# Patient Record
Sex: MALE | Race: BLACK OR AFRICAN AMERICAN | NOT HISPANIC OR LATINO | Employment: OTHER | URBAN - METROPOLITAN AREA
[De-identification: names, ages, dates, MRNs, and addresses within clinical notes are randomized per-mention and may not be internally consistent; named-entity substitution may affect disease eponyms.]

---

## 2024-06-13 ENCOUNTER — HOSPITAL ENCOUNTER (EMERGENCY)
Facility: HOSPITAL | Age: 79
Discharge: HOME | End: 2024-06-13
Attending: INTERNAL MEDICINE
Payer: OTHER GOVERNMENT

## 2024-06-13 VITALS
OXYGEN SATURATION: 96 % | RESPIRATION RATE: 20 BRPM | WEIGHT: 150 LBS | HEIGHT: 68 IN | BODY MASS INDEX: 22.73 KG/M2 | HEART RATE: 84 BPM | SYSTOLIC BLOOD PRESSURE: 113 MMHG | DIASTOLIC BLOOD PRESSURE: 75 MMHG | TEMPERATURE: 98 F

## 2024-06-13 DIAGNOSIS — J90 PLEURAL EFFUSION: ICD-10-CM

## 2024-06-13 DIAGNOSIS — R06.02 SOB (SHORTNESS OF BREATH): ICD-10-CM

## 2024-06-13 DIAGNOSIS — I50.9 ACUTE ON CHRONIC CONGESTIVE HEART FAILURE, UNSPECIFIED HEART FAILURE TYPE: ICD-10-CM

## 2024-06-13 DIAGNOSIS — I50.20 HFREF (HEART FAILURE WITH REDUCED EJECTION FRACTION): Primary | ICD-10-CM

## 2024-06-13 DIAGNOSIS — R79.89 ELEVATED TROPONIN: ICD-10-CM

## 2024-06-13 DIAGNOSIS — R06.00 DYSPNEA: ICD-10-CM

## 2024-06-13 PROBLEM — F01.50 VASCULAR DEMENTIA, UNSPECIFIED SEVERITY, WITHOUT BEHAVIORAL DISTURBANCE, PSYCHOTIC DISTURBANCE, MOOD DISTURBANCE, AND ANXIETY: Status: ACTIVE | Noted: 2024-06-13

## 2024-06-13 LAB
ALBUMIN SERPL-MCNC: 3.8 G/DL (ref 3.3–5.5)
ALP SERPL-CCNC: 93 U/L (ref 42–141)
BILIRUB SERPL-MCNC: 1.5 MG/DL (ref 0.2–1.6)
BUN SERPL-MCNC: 25 MG/DL (ref 7–22)
CALCIUM SERPL-MCNC: 9.8 MG/DL (ref 8–10.3)
CHLORIDE SERPL-SCNC: 106 MMOL/L (ref 98–108)
CREAT SERPL-MCNC: 1.6 MG/DL (ref 0.6–1.2)
GLUCOSE SERPL-MCNC: 115 MG/DL (ref 73–118)
HCT, POC: NORMAL
HGB, POC: NORMAL (ref 14–18)
MCH, POC: NORMAL
MCHC, POC: NORMAL
MCV, POC: NORMAL
MPV, POC: NORMAL
POC ALT (SGPT): 19 U/L (ref 10–47)
POC AST (SGOT): 31 U/L (ref 11–38)
POC B-TYPE NATRIURETIC PEPTIDE: 3290 PG/ML (ref 0–100)
POC CARDIAC TROPONIN I: 0.12 NG/ML (ref 0–0.08)
POC CARDIAC TROPONIN I: 0.15 NG/ML (ref 0–0.08)
POC D-DI: 3380 NG/ML (ref 0–450)
POC PLATELET COUNT: NORMAL
POC TCO2: 24 MMOL/L (ref 18–33)
POCT GLUCOSE: 108 MG/DL (ref 70–110)
POTASSIUM BLD-SCNC: 4 MMOL/L (ref 3.6–5.1)
PROTEIN, POC: 7.7 G/DL (ref 6.4–8.1)
RBC, POC: NORMAL
RDW, POC: NORMAL
SAMPLE: ABNORMAL
SAMPLE: ABNORMAL
SODIUM BLD-SCNC: 141 MMOL/L (ref 128–145)
WBC, POC: NORMAL

## 2024-06-13 PROCEDURE — 85379 FIBRIN DEGRADATION QUANT: CPT | Mod: ER

## 2024-06-13 PROCEDURE — 99285 EMERGENCY DEPT VISIT HI MDM: CPT | Mod: 25,ER

## 2024-06-13 PROCEDURE — 83880 ASSAY OF NATRIURETIC PEPTIDE: CPT | Mod: ER

## 2024-06-13 PROCEDURE — 93010 ELECTROCARDIOGRAM REPORT: CPT | Mod: ,,, | Performed by: INTERNAL MEDICINE

## 2024-06-13 PROCEDURE — 25000003 PHARM REV CODE 250: Mod: ER | Performed by: INTERNAL MEDICINE

## 2024-06-13 PROCEDURE — 93005 ELECTROCARDIOGRAM TRACING: CPT | Mod: ER

## 2024-06-13 PROCEDURE — 25500020 PHARM REV CODE 255: Mod: ER | Performed by: INTERNAL MEDICINE

## 2024-06-13 PROCEDURE — 85025 COMPLETE CBC W/AUTO DIFF WBC: CPT | Mod: ER

## 2024-06-13 PROCEDURE — 82962 GLUCOSE BLOOD TEST: CPT | Mod: ER

## 2024-06-13 PROCEDURE — 80053 COMPREHEN METABOLIC PANEL: CPT | Mod: ER

## 2024-06-13 PROCEDURE — 96372 THER/PROPH/DIAG INJ SC/IM: CPT | Mod: 59 | Performed by: INTERNAL MEDICINE

## 2024-06-13 PROCEDURE — 63600175 PHARM REV CODE 636 W HCPCS: Mod: ER | Performed by: INTERNAL MEDICINE

## 2024-06-13 PROCEDURE — 96374 THER/PROPH/DIAG INJ IV PUSH: CPT | Mod: ER

## 2024-06-13 RX ORDER — CLOPIDOGREL BISULFATE 75 MG/1
75 TABLET ORAL DAILY
COMMUNITY
Start: 2023-09-28 | End: 2024-09-28

## 2024-06-13 RX ORDER — SPIRONOLACTONE 25 MG/1
25 TABLET ORAL DAILY
COMMUNITY
Start: 2023-09-05 | End: 2024-09-28

## 2024-06-13 RX ORDER — ASPIRIN 325 MG
325 TABLET ORAL
Status: COMPLETED | OUTPATIENT
Start: 2024-06-13 | End: 2024-06-13

## 2024-06-13 RX ORDER — FUROSEMIDE 10 MG/ML
60 INJECTION INTRAMUSCULAR; INTRAVENOUS
Status: COMPLETED | OUTPATIENT
Start: 2024-06-13 | End: 2024-06-13

## 2024-06-13 RX ORDER — HYDRALAZINE HYDROCHLORIDE 10 MG/1
10 TABLET, FILM COATED ORAL 3 TIMES DAILY
COMMUNITY
Start: 2023-09-28 | End: 2024-09-28

## 2024-06-13 RX ORDER — ISOSORBIDE DINITRATE 20 MG/1
10 TABLET ORAL 3 TIMES DAILY
COMMUNITY
Start: 2023-09-28 | End: 2024-09-28

## 2024-06-13 RX ORDER — METOPROLOL SUCCINATE 100 MG/1
50 TABLET, EXTENDED RELEASE ORAL DAILY
COMMUNITY
Start: 2023-09-05 | End: 2024-09-28

## 2024-06-13 RX ORDER — ENOXAPARIN SODIUM 100 MG/ML
1 INJECTION SUBCUTANEOUS
Status: COMPLETED | OUTPATIENT
Start: 2024-06-13 | End: 2024-06-13

## 2024-06-13 RX ORDER — FUROSEMIDE 20 MG/1
20 TABLET ORAL DAILY
COMMUNITY
Start: 2023-09-05 | End: 2025-02-16

## 2024-06-13 RX ORDER — METFORMIN HYDROCHLORIDE 500 MG/1
250 TABLET ORAL 2 TIMES DAILY WITH MEALS
COMMUNITY
Start: 2023-09-05 | End: 2025-02-16

## 2024-06-13 RX ADMIN — IOHEXOL 75 ML: 350 INJECTION, SOLUTION INTRAVENOUS at 09:06

## 2024-06-13 RX ADMIN — ASPIRIN 325 MG ORAL TABLET 325 MG: 325 PILL ORAL at 07:06

## 2024-06-13 RX ADMIN — FUROSEMIDE 60 MG: 10 INJECTION, SOLUTION INTRAMUSCULAR; INTRAVENOUS at 07:06

## 2024-06-13 RX ADMIN — ENOXAPARIN SODIUM 70 MG: 80 INJECTION SUBCUTANEOUS at 07:06

## 2024-06-14 NOTE — ED PROVIDER NOTES
"Encounter Date: 6/13/2024       History     Chief Complaint   Patient presents with    Shortness of Breath     SOB, weakness, incontinence onset over the past 2 weeks. Pt spouse reports hx of CHF.      78-year-old male with a past medical history significant CHF and dementia presents to the emergency department complaining difficulty breathing, progressively worsening for the past few weeks.  He was accompanied by his spouse who states patient has progressively worsening dementia over the past few weeks as well.  Denies fever/chills/nausea/vomiting/chest pain.  Patient's wife states the patient is no longer able to sleep "lying down".  Patient sleeps in a chair at home.    The history is provided by the patient and the spouse. No  was used.     Review of patient's allergies indicates:   Allergen Reactions    Lisinopril Swelling     No past medical history on file.  No past surgical history on file.  No family history on file.     Review of Systems   Constitutional:  Negative for chills and fever.   Respiratory:  Positive for shortness of breath.    Cardiovascular:  Negative for chest pain.   Gastrointestinal:  Negative for nausea and vomiting.   All other systems reviewed and are negative.      Physical Exam     Initial Vitals [06/13/24 1817]   BP Pulse Resp Temp SpO2   133/83 75 20 97.7 °F (36.5 °C) 97 %      MAP       --         Physical Exam    Nursing note and vitals reviewed.  Constitutional: He is not diaphoretic.   HENT:   Head: Normocephalic and atraumatic.   Right Ear: External ear normal.   Left Ear: External ear normal.   Eyes: Conjunctivae are normal.   Neck: Neck supple.   Normal range of motion.  Cardiovascular:            Irregularly irregular rhythm with normal rate   Pulmonary/Chest: He has no wheezes. He has no rhonchi. He exhibits no tenderness.   good air movement bilaterally   Abdominal: Abdomen is soft. Bowel sounds are normal. He exhibits no distension. There is no " abdominal tenderness.   Musculoskeletal:         General: Edema (Bilateral lower extremity pitting edema) present. No tenderness.      Cervical back: Normal range of motion and neck supple.     Neurological: He is alert. He has normal strength. No cranial nerve deficit. GCS score is 15. GCS eye subscore is 4. GCS verbal subscore is 5. GCS motor subscore is 6.   Skin: Skin is warm and dry. Capillary refill takes less than 2 seconds.   Psychiatric: He has a normal mood and affect.         ED Course   Procedures  Labs Reviewed   TROPONIN ISTAT - Abnormal; Notable for the following components:       Result Value    POC Cardiac Troponin I 0.15 (*)     All other components within normal limits   TROPONIN ISTAT - Abnormal; Notable for the following components:    POC Cardiac Troponin I 0.12 (*)     All other components within normal limits   POCT CMP - Abnormal; Notable for the following components:    POC BUN 25 (*)     POC Creatinine 1.6 (*)     All other components within normal limits   POCT B-TYPE NATRIURETIC PEPTIDE (BNP) - Abnormal; Notable for the following components:    POC B-Type Natriuretic Peptide 3290 (*)     All other components within normal limits   POCT D DIMER - Abnormal; Notable for the following components:    POC D-DI 3380 (*)     All other components within normal limits   POCT CBC   POCT GLUCOSE MONITORING CONTINUOUS   POCT GLUCOSE   POCT CMP   POCT TROPONIN   POCT B-TYPE NATRIURETIC PEPTIDE (BNP)   POCT B-TYPE NATRIURETIC PEPTIDE (BNP)   POCT D DIMER   POCT TROPONIN          Imaging Results               CTA Chest Non-Coronary (PE Studies) (Final result)  Result time 06/13/24 22:03:46      Final result by Jeremy Squires MD (06/13/24 22:03:46)                   Impression:      There is no large central saddle type pulmonary embolus, and the central pulmonary arterial vascular structures demonstrate no evidence for pulmonary embolus, the small distal vessels demonstrate variable opacification,  which is thought most likely artifactual however due to the appearance small distal pulmonary emboli cannot be excluded, close clinical and historical correlation is otherwise needed to determine need for additional follow-up or therapy.    The main pulmonary artery appears enlarged, this can be seen with pulmonary hypertension.    Moderate to large right-sided pleural effusion, small to moderate left pleural effusion.    The lungs demonstrate atelectatic change, peribronchial thickening and patchy ground-glass infiltrates.    Gynecomastia.    Mild free fluid of the upper abdomen, mild perinephric stranding, mild haziness of the body wall fat planes, for which clinical and historical correlation is needed.    Additional findings as above.    This report was flagged in Epic as abnormal.      Electronically signed by: Jeremy Squires  Date:    06/13/2024  Time:    22:03               Narrative:    EXAMINATION:  CTA CHEST NON CORONARY (PE STUDIES)    CLINICAL HISTORY:  Pulmonary embolism (PE) suspected, positive D-dimer;    TECHNIQUE:  Low dose axial images, sagittal and coronal reformations were obtained from the thoracic inlet to the lung bases following the IV administration of 75 mL of Omnipaque 350.  Contrast timing was optimized to evaluate the pulmonary arteries.  MIP images were performed.    COMPARISON:  Chest radiograph June 13, 2024    FINDINGS:  There is artifact present, including motion artifact, this diminishes sensitivity of the exam.  The pulmonary arterial vascular demonstrate heterogeneity most notably involving the midsize and smaller pulmonary arteries, and many of the small pulmonary arteries demonstrate variable opacification, on close evaluation this is thought likely artifactual, however the possibility of small distal pulmonary emboli cannot be excluded on this exam.  There is no large central saddle type pulmonary embolus, and the central pulmonary arterial vascular demonstrate no evidence for  pulmonary emboli.    The main pulmonary artery appears prominent measuring 3.5 cm in transverse dimension, this can be seen with pulmonary hypertension.  The thoracic aorta demonstrates mild opacification, atherosclerotic change noted.  There is no aneurysmal dilatation.    There is no enlarged mediastinal or hilar adenopathy.  Coronary arterial atherosclerotic change noted.  There is no pericardial effusion.    There is moderate to large pleural effusion on the right and small to moderate pleural effusion on the left.  The lungs bilaterally demonstrate motion artifact, there are patchy ground-glass infiltrates bilaterally.  There are atelectatic changes, right greater than left.    Bronchial thickening is noted bilaterally.  There is no pneumothorax.    There is appearance of suspected bilateral gynecomastia.  There is mild free fluid of the upper abdomen noted.  There is a hypodensity at the upper pole of the right kidney, incompletely imaged, the visualized aspects appear to measure approximately 9.3 Hounsfield units.  Mild symmetric perinephric stranding is noted, nonspecific, incompletely imaged, clinical correlation is needed.  Mild accentuation of the body wall fat planes may relate to a pattern of mild body wall edema.    The osseous structures demonstrate chronic change.                                       X-Ray Chest AP Portable (Final result)  Result time 06/13/24 19:20:53      Final result by Minor Christensen MD (06/13/24 19:20:53)                   Impression:      As above.      Electronically signed by: Minor Christensen MD  Date:    06/13/2024  Time:    19:20               Narrative:    EXAMINATION:  XR CHEST AP PORTABLE    CLINICAL HISTORY:  Dyspnea, unspecified    TECHNIQUE:  Single frontal view of the chest was performed.    COMPARISON:  None    FINDINGS:  Cardiac silhouette is enlarged.  Lungs are mildly hypoinflated.  There is suspected small right pleural effusion with right basilar  "atelectasis or consolidation.  No significant left-sided pleural effusion is seen.  No pneumothorax.                                       Medications   aspirin tablet 325 mg (325 mg Oral Given 6/13/24 1931)   enoxaparin injection 70 mg (70 mg Subcutaneous Given 6/13/24 1931)   furosemide injection 60 mg (60 mg Intravenous Given 6/13/24 1956)   iohexoL (OMNIPAQUE 350) injection 100 mL (75 mLs Intravenous Given 6/13/24 2141)     Medical Decision Making  78-year-old male with a past medical history significant CHF and dementia presents to the emergency department complaining difficulty breathing, progressively worsening for the past few weeks.  He was accompanied by his spouse who states patient has progressively worsening dementia over the past few weeks as well.  Denies fever/chills/nausea/vomiting/chest pain.  Patient's wife states the patient is no longer able to sleep "lying down".  Patient sleeps in a chair at home.  Course of ED stay:   1. Cardiovascular-patient has been chest pain-free throughout ED stay.  EKG shows atrial fibrillation with normal rate and no evidence of STEMI.  Troponin was elevated (0.15).  Repeat troponin was 0.12.  BNP was 3290.  Aspirin and Lovenox were given in the ED.  2. Pulmonary-O2 sats have been normal on room air.  Chest x-ray shows small right pleural effusion.  D-dimer was ordered.  3. Hematology/infectious disease-patient has been afebrile throughout ED stay.  CBC is reassuring.  4. GI/nutrition/renal/endocrine-CMP is reassuring.  Plan is to admit to Hospital Medicine for CHF exacerbation, elevated troponin and pleural effusion.    Patient states he would like to forego transfer/admission at this time and follow-up with his PCP tomorrow.  Patient was given strict instructions to return to the emergency department if condition worsens.    Amount and/or Complexity of Data Reviewed  Labs: ordered.  Radiology: ordered.    Risk  OTC drugs.  Prescription drug management.               "                        Clinical Impression:  Final diagnoses:  [R06.02] SOB (shortness of breath)  [R06.00] Dyspnea  [I50.20] HFrEF (heart failure with reduced ejection fraction) (Primary)  [R79.89] Elevated troponin  [I50.9] Acute on chronic congestive heart failure, unspecified heart failure type  [J90] Pleural effusion          ED Disposition Condition    Discharge Stable          ED Prescriptions    None       Follow-up Information       Follow up With Specialties Details Why Contact Info    [facility] ED Emergency Medicine Call  If symptoms worsen [id number] [facility] [id number] [phone]             Yang Gomez MD  06/14/24 5362

## 2024-06-16 LAB
OHS QRS DURATION: 96 MS
OHS QTC CALCULATION: 493 MS